# Patient Record
Sex: FEMALE | Race: WHITE
[De-identification: names, ages, dates, MRNs, and addresses within clinical notes are randomized per-mention and may not be internally consistent; named-entity substitution may affect disease eponyms.]

---

## 2022-07-21 ENCOUNTER — HOSPITAL ENCOUNTER (OUTPATIENT)
Dept: HOSPITAL 47 - RADMAMWWP | Age: 67
Discharge: HOME | End: 2022-07-21
Attending: FAMILY MEDICINE
Payer: MEDICARE

## 2022-07-21 DIAGNOSIS — Z78.0: ICD-10-CM

## 2022-07-21 DIAGNOSIS — N63.23: Primary | ICD-10-CM

## 2022-07-21 PROCEDURE — 76641 ULTRASOUND BREAST COMPLETE: CPT

## 2022-07-21 PROCEDURE — 77062 BREAST TOMOSYNTHESIS BI: CPT

## 2022-07-21 PROCEDURE — 77066 DX MAMMO INCL CAD BI: CPT

## 2022-07-21 NOTE — MM
Reason for Exam: Clinical finding. 

Last mammogram was performed 20 year(s) and 0 month(s) ago. 

Indicated Problems: 

Lump or thickening of the left side (size 20) for 1 Month(s).





Patient History: 

Menarche at age 12. First Full-Term Pregnancy at age 24. Postmenopausal. Patient has history of

breast feeding. 





Risk Values: 

Lydia 5 year model risk: 1.9%.

NCI Lifetime model risk: 6.4%.





Tissue Density: 

There are scattered fibroglandular densities.





Findings: 

Analyzed By CAD. 

High density 2.6 cm partially visualized mass far posterior central and inferior left breast, 6:00

position. Overlying palpable marker. Some scattered benign-appearing round calcifications are noted.

No other discrete abnormality is seen. 





Overall Assessment: Incomplete: need additional imaging evaluation, BI-RAD 0





Management: 

Diagnostic Breast Ultrasound of the left breast.

Whole left breast ultrasound.



Electronically signed and approved by: Armando Epperson M.D. Radiologist

## 2022-07-21 NOTE — USB
Reason for Exam: Clinical finding. 





Patient History: 

Menarche at age 12. First Full-Term Pregnancy at age 24. Postmenopausal. Patient has history of

breast feeding. 





Risk Values: 

Lydia 5 year model risk: 1.9%.

NCI Lifetime model risk: 6.4%.

Technique: 

Method: Whole Breast Handheld.  Patient Position: Supine.  





Findings: 

The whole breast of the left breast, the axilla of the left breast and the retroareolar of the left

breast were scanned.

Whole left breast ultrasound was performed including scanning of the subareolar region and axilla.



At the 5:00 palpable site, 7 cm from the nipple, there is the patient's suspicious irregular

hypoechoic mass measuring 3.0 x 2.6 x 1.4 cm. This is located very close to the underlying

pectoralis major muscle. Biopsy is recommended.



Located 1.4 cm away, also at 5:00, closer towards the nipple, a couple adjacent lobulated hypoechoic

areas are present entering 8 x 4 mm and 7 mm. These 2 areas are adjacent to each other with

aggregate dimension of 1.7 cm. Biopsy is recommended.



No other solid or cystic lesion or axillary lymphadenopathy. 





Overall Assessment: Highly suggestive of malignancy, BI-RAD 5





Management: 

Ultrasound Core Biopsy of the left breast.

1. Two site ultrasound guided core needle biopsy left breast, (1) 5:00, BI-RADS 5 left breast mass

(2) as well as the 2 adjacent possible satellite nodules which have an aggregate dimension of 1.7

cm.

2. The dominant mass is very close to the chest wall musculature. Correlate with physical exam

findings. MRI can be considered if concern for chest wall involvement.



Electronically signed and approved by: Armando Epperson M.D. Radiologist

## 2022-07-27 ENCOUNTER — HOSPITAL ENCOUNTER (OUTPATIENT)
Dept: HOSPITAL 47 - RADUSWWP | Age: 67
End: 2022-07-27
Attending: FAMILY MEDICINE
Payer: MEDICARE

## 2022-07-27 DIAGNOSIS — D05.12: Primary | ICD-10-CM

## 2022-07-27 DIAGNOSIS — Z17.0: ICD-10-CM

## 2022-07-27 PROCEDURE — 19084 BX BREAST ADD LESION US IMAG: CPT

## 2022-07-27 PROCEDURE — 88341 IMHCHEM/IMCYTCHM EA ADD ANTB: CPT

## 2022-07-27 PROCEDURE — 19083 BX BREAST 1ST LESION US IMAG: CPT

## 2022-07-27 PROCEDURE — 88305 TISSUE EXAM BY PATHOLOGIST: CPT

## 2022-07-27 PROCEDURE — 88342 IMHCHEM/IMCYTCHM 1ST ANTB: CPT

## 2022-08-26 ENCOUNTER — HOSPITAL ENCOUNTER (OUTPATIENT)
Dept: HOSPITAL 47 - OR | Age: 67
Discharge: HOME | End: 2022-08-26
Attending: SURGERY
Payer: MEDICARE

## 2022-08-26 VITALS — SYSTOLIC BLOOD PRESSURE: 142 MMHG | DIASTOLIC BLOOD PRESSURE: 76 MMHG | HEART RATE: 58 BPM

## 2022-08-26 VITALS — TEMPERATURE: 97 F

## 2022-08-26 VITALS — RESPIRATION RATE: 16 BRPM

## 2022-08-26 DIAGNOSIS — Z80.3: ICD-10-CM

## 2022-08-26 DIAGNOSIS — Z80.51: ICD-10-CM

## 2022-08-26 DIAGNOSIS — C50.512: Primary | ICD-10-CM

## 2022-08-26 DIAGNOSIS — E11.9: ICD-10-CM

## 2022-08-26 DIAGNOSIS — Z79.84: ICD-10-CM

## 2022-08-26 DIAGNOSIS — Z88.0: ICD-10-CM

## 2022-08-26 DIAGNOSIS — Z80.8: ICD-10-CM

## 2022-08-26 LAB — GLUCOSE BLD-MCNC: 145 MG/DL (ref 70–110)

## 2022-08-26 PROCEDURE — 77001 FLUOROGUIDE FOR VEIN DEVICE: CPT

## 2022-08-26 PROCEDURE — 36561 INSERT TUNNELED CV CATH: CPT

## 2022-08-26 NOTE — FL
Fluoroscopy

 

INDICATION: Port-A-Cath placement

 

FINDINGS:

Images obtained: One.

 

IMPRESSIONS:

1. Documentation of fluoroscopy.

## 2022-08-26 NOTE — XR
EXAMINATION TYPE: XR chest 1V confirm line [facility]

 

DATE OF EXAM: 8/26/2022 1:30 PM

 

COMPARISON: Fluoroscopic images 8/26/2022.

 

TECHNIQUE: XR chest 1V confirm line [facility] 

 

CLINICAL INDICATION:Female, 67 years old with history of check line; 

 

FINDINGS: 

Lungs/Pleura: There is no evidence of pleural effusion, focal consolidation, or pneumothorax.  

Pulmonary vascularity: Unremarkable.

Heart/mediastinum: Cardiomediastinal silhouette is unremarkable.

Musculoskeletal: No acute osseous pathology.

 

Lines/Tubes:

Wbwsii-u-[id number] projecting over the right hemithorax with distal tip projecting over the low superior v
dimple cava.

 

IMPRESSION: 

Interval placement of [id number] with tip terminating in the low SVC. No pneumothorax.

## 2022-08-26 NOTE — P.OP
Date of Procedure: 08/26/22


Procedure(s) Performed: 


PREOPERATIVE DIAGNOSIS: Breast cancer


POSTOPERATIVE DIAGNOSIS: Same


PROCEDURE: Port-A-Cath placement with fluoroscopic and ultrasound guidance


SURGEON: Cesar


EBL: Minimal


ANESTHESIA: Gen.


COMPLICATIONS: None


OPERATIVE PROCEDURE: Patient was brought and placed on the operative table in 

the supine position.  The patient was sedated per anesthesia that time.  The 

chest and neck were prepped and draped in usual sterile fashion.  The ultrasound

probe was used to identify the location of the right internal jugular vein.  The

skin was localized with lidocaine.  The Seldinger needle was advanced into the 

IJ under ultrasound guidance.  The wire was advanced through the needle under fl

uoroscopic guidance into the superior vena cava.  A port pocket was created in 

the right infraclavicular location.  The catheter was tunneled from the wire 

entrance site to the port pocket.  The port was then connected to the catheter. 

The dilator introducer was threaded over the guidewire.  The guidewire and 

dilator were then removed.  The catheter was advanced through the introducer and

introducer was then removed.  The tip was seen to be in the right atrial 

junction via fluoroscopy.  A picture of the radiograph showing the tip at the 

radial digital junction was taken.  Port was flushed with both saline and a Hep-

Lock solution.  There was good flow both in and out of the port.  The port was 

sutured in underlying tissues using 3-0 silk sutures.  The subcutaneous tissues 

were reapproximated using 3-0 Vicryl sutures and the skin at both locations 

using 4-0 Monocryl sutures.  Skin glue and sterile dressings then applied.


DISPOSITION: Stable to recovery room

## 2022-08-26 NOTE — P.GSHP
History of Present Illness


H&P Date: 08/26/22


Chief Complaint: Left breast cancer





67-year-old female here today for Port-A-Cath placement.  Patient has a recent 

diagnosis of left breast cancer.  Starting chemotherapy in the next 1-2 weeks.  

She has not had definitive surgery.





Past Medical History


Past Medical History: Cancer, Diabetes Mellitus


Additional Past Medical History / Comment(s): type 2 diabetes just started on 

metformin.  breast cancer left  dx 6/22


History of Any Multi-Drug Resistant Organisms: None Reported


Past Surgical History: Tubal Ligation


Additional Past Surgical History / Comment(s): breast biopsy left


Past Anesthesia/Blood Transfusion Reactions: No Reported Reaction


Additional Past Anesthesia/Blood Transfusion Reaction / Comment(s): noblood 

transfusions


Smoking Status: Never smoker





- Past Family History


  ** Mother


Family Medical History: No Reported History





  ** Father


Family Medical History: No Reported History





Medications and Allergies


                                Home Medications











 Medication  Instructions  Recorded  Confirmed  Type


 


metFORMIN HCL [Glucophage] 500 mg PO BID 07/22/22 08/26/22 History








                                    Allergies











Allergy/AdvReac Type Severity Reaction Status Date / Time


 


Penicillins Allergy Intermediate Rash/Hives Verified 08/26/22 09:45














Surgical - Exam


                                   Vital Signs











Temp Pulse Resp BP Pulse Ox


 


 97.1 F L  62   16   149/74   98 


 


 08/26/22 10:00  08/26/22 10:00  08/26/22 10:00  08/26/22 10:00  08/26/22 10:00

















Physical exam:


General: Well-developed, well-nourished


HEENT: Normocephalic, sclerae nonicteric


Abdomen: Nontender, nondistended


Extremities: No edema


Neuro: Alert and oriented





Results





- Labs


                  Abnormal Lab Results - Last 24 Hours (Table)











  08/26/22 Range/Units





  10:01 


 


POC Glucose (mg/dL)  145 H  ()  mg/dL














Assessment and Plan


(1) Breast cancer, left


Narrative/Plan: 


Will proceed with Port-A-Cath placement at this time. Risks of bleeding, 

infection, DVT, pneumothorax, catheter malfunction, anesthesia related 

complications were discussed.  The patient understands and wishes to proceed.


Current Visit: Yes   Status: Acute   Code(s): C50.912 - MALIGNANT NEOPLASM OF 

UNSPECIFIED SITE OF LEFT FEMALE BREAST   SNOMED Code(s): 911373443

## 2023-01-23 ENCOUNTER — HOSPITAL ENCOUNTER (OUTPATIENT)
Dept: HOSPITAL 47 - LABWHC1 | Age: 68
Discharge: HOME | End: 2023-01-23
Attending: NURSE PRACTITIONER
Payer: MEDICARE

## 2023-01-23 DIAGNOSIS — E27.40: Primary | ICD-10-CM

## 2023-01-23 DIAGNOSIS — E07.9: ICD-10-CM

## 2023-01-23 DIAGNOSIS — E78.70: ICD-10-CM

## 2023-01-23 DIAGNOSIS — E11.9: ICD-10-CM

## 2023-01-23 DIAGNOSIS — D50.9: ICD-10-CM

## 2023-01-23 DIAGNOSIS — E83.00: ICD-10-CM

## 2023-01-23 DIAGNOSIS — D68.9: ICD-10-CM

## 2023-01-23 LAB
ALBUMIN SERPL-MCNC: 4.3 G/DL (ref 3.8–4.9)
ALBUMIN/GLOB SERPL: 2.08 G/DL (ref 1.6–3.17)
ALP SERPL-CCNC: 53 U/L (ref 41–126)
ALT SERPL-CCNC: 19 U/L (ref 8–44)
AMYLASE SERPL-CCNC: 34 U/L (ref 23–121)
ANION GAP SERPL CALC-SCNC: 16.1 MMOL/L (ref 10–18)
AST SERPL-CCNC: 27 U/L (ref 13–35)
BASOPHILS # BLD AUTO: 0.03 X 10*3/UL (ref 0–0.1)
BASOPHILS NFR BLD AUTO: 0.8 %
BUN SERPL-SCNC: 10.4 MG/DL (ref 9–27)
BUN/CREAT SERPL: 17.93 RATIO (ref 12–20)
CALCIUM SPEC-MCNC: 9.3 MG/DL (ref 8.7–10.3)
CANCER AG15-3 SERPL-ACNC: 24.4 U/ML (ref 0–32.3)
CHLORIDE SERPL-SCNC: 103 MMOL/L (ref 96–109)
CO2 SERPL-SCNC: 21 MMOL/L (ref 20–27.5)
CRP SERPL HS-MCNC: 1.18 MG/L (ref 0–3)
EOSINOPHIL # BLD AUTO: 0.05 X 10*3/UL (ref 0.04–0.35)
EOSINOPHIL NFR BLD AUTO: 1.3 %
ERYTHROCYTE [DISTWIDTH] IN BLOOD BY AUTOMATED COUNT: 4.12 X 10*6/UL (ref 4.1–5.2)
ERYTHROCYTE [DISTWIDTH] IN BLOOD: 15.4 % (ref 11.5–14.5)
FERRITIN SERPL-MCNC: 289 NG/ML (ref 10–291)
GLIADIN IGA SER-ACNC: <0.2 U/ML
GLIADIN IGG SER IA-ACNC: <0.4 U/ML
GLIADIN PEPTIDE IGA SER-ACNC: NEGATIVE
GLIADIN PEPTIDE IGG SER-ACNC: NEGATIVE
GLOBULIN SER CALC-MCNC: 2.1 G/DL (ref 1.6–3.3)
GLUCOSE SERPL-MCNC: 115 MG/DL (ref 70–110)
HCT VFR BLD AUTO: 39.2 % (ref 37.2–46.3)
HGB BLD-MCNC: 12.6 G/DL (ref 12–15)
IMM GRANULOCYTES BLD QL AUTO: 0.3 %
IRON SERPL-MCNC: 53 UG/DL (ref 50–170)
LIPASE SERPL-CCNC: 29 U/L (ref 14–63)
LYMPHOCYTES # SPEC AUTO: 0.8 X 10*3/UL (ref 0.9–5)
LYMPHOCYTES NFR SPEC AUTO: 20.7 %
MAGNESIUM SPEC-SCNC: 2 MG/DL (ref 1.5–2.4)
MCH RBC QN AUTO: 30.6 PG (ref 27–32)
MCHC RBC AUTO-ENTMCNC: 32.1 G/DL (ref 32–37)
MCV RBC AUTO: 95.1 FL (ref 80–97)
MONOCYTES # BLD AUTO: 0.22 X 10*3/UL (ref 0.2–1)
MONOCYTES NFR BLD AUTO: 5.7 %
NEUTROPHILS # BLD AUTO: 2.76 X 10*3/UL (ref 1.8–7.7)
NEUTROPHILS NFR BLD AUTO: 71.2 %
NRBC BLD AUTO-RTO: 0 /100 WBCS (ref 0–0)
PLATELET # BLD AUTO: 169 X 10*3/UL (ref 140–440)
POTASSIUM SERPL-SCNC: 4 MMOL/L (ref 3.5–5.5)
PROT SERPL-MCNC: 6.4 G/DL (ref 6.2–8.2)
SODIUM SERPL-SCNC: 140 MMOL/L (ref 135–145)
T4 FREE SERPL-MCNC: 1.09 NG/DL (ref 0.8–1.8)
TIBC SERPL-MCNC: 407 UG/DL (ref 228–460)
WBC # BLD AUTO: 3.87 X 10*3/UL (ref 4.5–10)

## 2023-01-23 PROCEDURE — 83090 ASSAY OF HOMOCYSTEINE: CPT

## 2023-01-23 PROCEDURE — 82150 ASSAY OF AMYLASE: CPT

## 2023-01-23 PROCEDURE — 83735 ASSAY OF MAGNESIUM: CPT

## 2023-01-23 PROCEDURE — 82397 CHEMILUMINESCENT ASSAY: CPT

## 2023-01-23 PROCEDURE — 83695 ASSAY OF LIPOPROTEIN(A): CPT

## 2023-01-23 PROCEDURE — 83516 IMMUNOASSAY NONANTIBODY: CPT

## 2023-01-23 PROCEDURE — 83540 ASSAY OF IRON: CPT

## 2023-01-23 PROCEDURE — 82728 ASSAY OF FERRITIN: CPT

## 2023-01-23 PROCEDURE — 82607 VITAMIN B-12: CPT

## 2023-01-23 PROCEDURE — 85025 COMPLETE CBC W/AUTO DIFF WBC: CPT

## 2023-01-23 PROCEDURE — 84481 FREE ASSAY (FT-3): CPT

## 2023-01-23 PROCEDURE — 82533 TOTAL CORTISOL: CPT

## 2023-01-23 PROCEDURE — 83036 HEMOGLOBIN GLYCOSYLATED A1C: CPT

## 2023-01-23 PROCEDURE — 83880 ASSAY OF NATRIURETIC PEPTIDE: CPT

## 2023-01-23 PROCEDURE — 82525 ASSAY OF COPPER: CPT

## 2023-01-23 PROCEDURE — 84443 ASSAY THYROID STIM HORMONE: CPT

## 2023-01-23 PROCEDURE — 83550 IRON BINDING TEST: CPT

## 2023-01-23 PROCEDURE — 86141 C-REACTIVE PROTEIN HS: CPT

## 2023-01-23 PROCEDURE — 84100 ASSAY OF PHOSPHORUS: CPT

## 2023-01-23 PROCEDURE — 36415 COLL VENOUS BLD VENIPUNCTURE: CPT

## 2023-01-23 PROCEDURE — 84630 ASSAY OF ZINC: CPT

## 2023-01-23 PROCEDURE — 83525 ASSAY OF INSULIN: CPT

## 2023-01-23 PROCEDURE — 84439 ASSAY OF FREE THYROXINE: CPT

## 2023-01-23 PROCEDURE — 86376 MICROSOMAL ANTIBODY EACH: CPT

## 2023-01-23 PROCEDURE — 84432 ASSAY OF THYROGLOBULIN: CPT

## 2023-01-23 PROCEDURE — 86300 IMMUNOASSAY TUMOR CA 15-3: CPT

## 2023-01-23 PROCEDURE — 83690 ASSAY OF LIPASE: CPT

## 2023-01-23 PROCEDURE — 82746 ASSAY OF FOLIC ACID SERUM: CPT

## 2023-01-23 PROCEDURE — 80053 COMPREHEN METABOLIC PANEL: CPT

## 2023-01-23 PROCEDURE — 84482 T3 REVERSE: CPT

## 2023-01-23 PROCEDURE — 83970 ASSAY OF PARATHORMONE: CPT

## 2023-05-01 ENCOUNTER — HOSPITAL ENCOUNTER (OUTPATIENT)
Dept: HOSPITAL 47 - RADBDWWP | Age: 68
Discharge: HOME | End: 2023-05-01
Attending: INTERNAL MEDICINE
Payer: MEDICARE

## 2023-05-01 DIAGNOSIS — Z01.818: Primary | ICD-10-CM

## 2023-05-01 DIAGNOSIS — R11.0: ICD-10-CM

## 2023-05-01 DIAGNOSIS — R19.7: ICD-10-CM

## 2023-05-01 DIAGNOSIS — I08.3: ICD-10-CM

## 2023-05-01 DIAGNOSIS — M85.851: ICD-10-CM

## 2023-05-01 DIAGNOSIS — Z78.0: ICD-10-CM

## 2023-05-01 DIAGNOSIS — E87.70: ICD-10-CM

## 2023-05-01 DIAGNOSIS — C50.512: ICD-10-CM

## 2023-05-01 PROCEDURE — 77080 DXA BONE DENSITY AXIAL: CPT

## 2023-05-01 PROCEDURE — 93306 TTE W/DOPPLER COMPLETE: CPT

## 2023-05-01 NOTE — BD
EXAMINATION TYPE: Axial Bone Density

 

DATE OF EXAM: 5/1/2023

 

CLINICAL HISTORY: 67 years old Female.  ICD-10 CODE: C50.512 BREAST CA

 

Height:  67.25

Weight:  171.9

 

FRAX RISK QUESTIONS:

Alcohol (3 or more units per day):  no

Family History (Parent hip fracture):  no

Glucocorticoids (More than 3mos):  no      

History of Fracture in Adulthood: no

 

Secondary Osteoporosis:

  1.  Type 1 Diabetes: no

  2.  Hyperthyroidism: no

  3.  Menopause before 45: no

  4.  Malnutrition: no

  5.  Chronic liver disease: no

Rheumatoid Arthritis: no

Current Tobacco Use: no

 

RISK FACTORS 

HISTORY OF: 

Hip Fracture (Right/Left): no

Spine Fracture: no

History of Wrist Fracture: no

Surgery to Spine/Hip(right/left)/Wrist (right/left): no

Family History of Osteoporosis: mother

Active: yes

Diet low in dairy products/other sources of calcium:  no

Postmenopausal woman: yes

Take estrogen and/or progesterone medications: no

Lost more than 2 inches in height since high school: no

Frequent falls: no

Poor Health: no

Hyperparathyroidism: no

Adrenal Insufficiency: no

 

MEDICATIONS: 

Prednisone or other steroids: no

Thyroid Medications:  no

Osteoporosis Medications: no

Additional Medications: Vit D, Metformin, Calcium, 

 

 

Additional History: Breast Ca. August 2022 with Lumpectomy, Chemo and Radiation

 

 

EXAM MEASUREMENTS: 

Bone mineral densitometry was performed using the [employer] System.

Bone mineral density as measured about the Lumbar spine is:  

----- L1-L4(G/cm2): 1.281

T Score Values are as follows:

----- L1: -0.7

----- L2: -0.4

----- L3: 0.7

----- L4: 2.9

----- L1-L4: 0.8

 

Z Score Values are as follows:

----- L1: 0.5

----- L2: 0.8

----- L3: 1.9

----- L4: 4.1

----- L1-L4: 2.1

Baseline Study

 

Bone mineral density about the R hip (g/cm2): 0.921

Bone mineral density about the L hip (g/cm2): 0.942

T Score values are as follows:

-----R Neck: -1.1

-----L Neck: -0.8

-----R Total: -0.7

-----L Total: -0.5

 

Z Score values are as follows:

-----R Neck: 0.3

-----L Neck: 0.5

-----R Total: 0.4

-----L Total: 0.6

Baseline Study 

 

 

FRAX%s: The graph provided illustrates a 8.8% chance for a major osteoporotic fx and a 0.9% chance fo
r the hips probability for fx in 10 years time.

 

 

 

 

IMPRESSION:

Osteopenia (T Score between -2.5 and -1) at the femoral neck level in the right hip.

 

There is slightly increased risk of fracture and the patient may be considered 

for treatment. 

 

Re-Screen 2-5 years.

 

NOTE:  T-SCORE=SD OF THE YOUNG ADULT MEAN.

## 2023-05-01 NOTE — CA
Transthoracic Echo Report 

 Name: Colleen Sanchez 

 MRN:    [id number] 

 Age:    67     Gender:     F 

 

 :    1955 

 Exam Date:     2023 13:26 

 Exam Location: [facility] 

 Ht (in):     67     Wt (lb):     171 

 Ordering Physician:        Sudeep Almaraz MD 

 Attending/Referring Phys: 

 Technician         Yolanda Mccall RDCS 

 Procedure CPT: 

 Indications:       C50.512 breast ca 

 

 Cardiac Hx: 

 Technical Quality:      Good 

 Contrast 1:                                Total Dose (mL): 

 Contrast 2:                                Total Dose (mL): 

 

 MEASUREMENTS  (Male / Female) Normal Values 

 2D ECHO 

 LV Diastolic Diameter PLAX        4.9 cm                4.2 - 5.9 / 3.9 - 5.3 cm 

 LV Systolic Diameter PLAX         2.7 cm                 

 IVS Diastolic Thickness           1.1 cm                0.6 - 1.0 / 0.6 - 0.9 cm 

 LVPW Diastolic Thickness          1.3 cm                0.6 - 1.0 / 0.6 - 0.9 cm 

 LV Relative Wall Thickness        0.5                    

 RV Internal Dim ED PLAX           3.3 cm                 

 LA Systolic Diameter LX           3.8 cm                3.0 - 4.0 / 2.7 - 3.8 cm 

 LA Volume                         70.7 cm???              18 - 58 / 22 - 52 cm??? 

 

 M-MODE 

 Aortic Root Diameter MM           3.3 cm                 

 MV E Point Septal Separation      0.5 cm                 

 AV Cusp Separation MM             2.1 cm                 

 

 DOPPLER 

 AV Peak Velocity                  165.8 cm/s             

 AV Peak Gradient                  11.0 mmHg              

 AI Peak Velocity                  273.9 cm/s             

 AI Peak Gradient                  30.0 mmHg              

 AI Pressure Half Time             1100.3 ms              

 MV Area PHT                       2.7 cm???                

 Mitral E Point Velocity           74.8 cm/s              

 Mitral A Point Velocity           94.0 cm/s              

 Mitral E to A Ratio               0.8                    

 MV Deceleration Time              280.4 ms               

 MV E' Velocity                    9.9 cm/s               

 Mitral E to MV E' Ratio           7.5                    

 TR Peak Velocity                  260.5 cm/s             

 TR Peak Gradient                  27.2 mmHg              

 Right Ventricular Systolic Press  32.2 mmHg              

 

 

 FINDINGS 

 Left Ventricle 

 Left ventricular ejection fraction is estimated at 55-60 %. Left ventricular  

 cavity size normal.  Mildly increased septal wall thickness. Mildly increased  

 posterior wall thickness.left ventricular cavity size normal. 

 

 Right Ventricle 

 Normal right ventricular size. Right ventricular systolic pressure within  

 normal limits. Right ventricular systolic pressure estimated at 32 mm hg. 

 

 Right Atrium 

 Normal right atrial size. 

 

 Left Atrium 

 Moderately increased left atrial volume. Mildly increased left atrial area. 

 

 Mitral Valve 

 Mitral annular calcification. Mild mitral regurgitation. 

 

 Aortic Valve 

 Trileaflet aortic valve. Aortic valve sclerosis. Mild aortic regurgitation. 

 

 Tricuspid Valve 

 Structurally normal tricuspid valve. Mild tricuspid regurgitation. 

 

 Pulmonic Valve 

 Pulmonic valve not well visualized. Trace pulmonic regurgitation. 

 

 Pericardium 

 Normal pericardium. No pericardial effusion. 

 

 Aorta 

 Normal size aortic root and proximal ascending aorta. 

 

 CONCLUSIONS 

 1.  Normal left ventricle size and systolic function 

 2.  Mild mitral, aortic and tricuspid regurgitation 

 3.  No pericardial effusion. 

 Previewed by:  

 Dr. Doyle Duran MD 

 (Electronically Signed) 

 Final Date:      01 May 2023 18:02

## 2023-07-10 ENCOUNTER — HOSPITAL ENCOUNTER (OUTPATIENT)
Dept: HOSPITAL 47 - RADMAMWWP | Age: 68
Discharge: HOME | End: 2023-07-10
Attending: RADIOLOGY
Payer: MEDICARE

## 2023-07-10 DIAGNOSIS — Z80.51: ICD-10-CM

## 2023-07-10 DIAGNOSIS — Z17.0: ICD-10-CM

## 2023-07-10 DIAGNOSIS — Z92.22: ICD-10-CM

## 2023-07-10 DIAGNOSIS — Z78.0: ICD-10-CM

## 2023-07-10 DIAGNOSIS — C50.512: Primary | ICD-10-CM

## 2023-07-10 PROCEDURE — 77062 BREAST TOMOSYNTHESIS BI: CPT

## 2023-07-10 PROCEDURE — 77066 DX MAMMO INCL CAD BI: CPT

## 2023-07-10 NOTE — MM
Reason for Exam: Follow-up at short interval from prior study. 

Last screening mammogram was performed 12 month(s) ago.





Patient History: 

Menarche at age 12. First Full-Term Pregnancy at age 24. Postmenopausal. Patient has history of

breast feeding. Breast cancer, left, age 66. 07/27/2022, Malignant US biopsy breast VAD LT on the

left side. 07/27/2022, US biopsy breast add'l VAD LT on the Left side. 





Tissue Density: 

The breast tissue is heterogeneously dense. This may lower the sensitivity of mammography.





Findings: 

Analyzed By CAD. 

Postlumpectomy radiation therapy changes are noted left breast. There is no evidence for recurrent

or residual mass. Scattered benign-appearing calcifications are seen bilaterally. 





Overall Assessment: Benign, BI-RAD 2





Management: 

Diagnostic Mammogram of both breasts in 1 year.

.  Results were given to the patient verbally at the time of exam.



Patient should continue monthly self-breast exams.  A clinical breast exam by your physician is

recommended on an annual basis.

This exam should not preclude additional follow-up of suspicious palpable abnormalities.





Note on Lydia scores and lifetime risk:

1. A Lydia score greater than 3% is considered moderate risk. If this is the case, consider

specialist referral to assess eligibility for a risk reducing agent.

2. If overall lifetime risk for the development of breast cancer is 20% or higher, the patient may

qualify for future screening with alternating mammogram and breast MRI.



Electronically signed and approved by: Leopold M. Fregoli, M.D. Radiologis

## 2023-08-07 ENCOUNTER — HOSPITAL ENCOUNTER (OUTPATIENT)
Dept: HOSPITAL 47 - RADECHMAIN | Age: 68
Discharge: HOME | End: 2023-08-07
Attending: INTERNAL MEDICINE
Payer: MEDICARE

## 2023-08-07 DIAGNOSIS — Z01.818: Primary | ICD-10-CM

## 2023-08-07 DIAGNOSIS — M85.9: ICD-10-CM

## 2023-08-07 DIAGNOSIS — Z71.3: ICD-10-CM

## 2023-08-07 DIAGNOSIS — C50.512: ICD-10-CM

## 2023-08-07 DIAGNOSIS — I08.1: ICD-10-CM

## 2023-08-07 PROCEDURE — 93306 TTE W/DOPPLER COMPLETE: CPT

## 2023-08-08 NOTE — CA
Transthoracic Echo Report 

 Name: Colleen Sanchez 

 MRN:    [id number] 

 Age:    67     Gender:     F 

 

 :    1955 

 Exam Date:     2023 13:32 

 Exam Location: [facility] 

 Ht (in):     69     Wt (lb):     172 

 Ordering Physician:        Sudeep Almaraz MD 

 Attending/Referring Phys: 

 Technician         Ciara Simeon [facility] 

 Procedure CPT: 

 Indications:       Z01.818 

 

 Cardiac Hx: 

 Technical Quality:      Fair 

 Contrast 1:                                Total Dose (mL): 

 Contrast 2:                                Total Dose (mL): 

 

 MEASUREMENTS  (Male / Female) Normal Values 

 2D ECHO 

 LV Diastolic Diameter PLAX        5.0 cm                4.2 - 5.9 / 3.9 - 5.3 cm 

 LV Systolic Diameter PLAX         3.4 cm                 

 IVS Diastolic Thickness           0.8 cm                0.6 - 1.0 / 0.6 - 0.9 cm 

 LVPW Diastolic Thickness          0.9 cm                0.6 - 1.0 / 0.6 - 0.9 cm 

 LV Relative Wall Thickness        0.3                    

 Ascending Aorta Diameter          4.0 cm                 

 

 M-MODE 

 Aortic Root Diameter MM           2.8 cm                 

 LA Systolic Diameter MM           3.5 cm                 

 LA Ao Ratio MM                    1.2                    

 AV Cusp Separation MM             1.9 cm                 

 

 DOPPLER 

 AV Peak Velocity                  163.0 cm/s             

 AV Peak Gradient                  10.6 mmHg              

 AV Mean Velocity                  112.5 cm/s             

 AV Mean Gradient                  5.7 mmHg               

 AV Velocity Time Integral         30.0 cm                

 LVOT Peak Velocity                121.1 cm/s             

 LVOT Peak Gradient                5.9 mmHg               

 LVOT Velocity Time Integral       23.1 cm                

 Mitral E Point Velocity           69.7 cm/s              

 Mitral A Point Velocity           95.3 cm/s              

 Mitral E to A Ratio               0.7                    

 MV Deceleration Time              337.0 ms               

 LV E' Lateral Velocity            11.6 cm/s              

 Mitral E to LV E' Lateral Ratio   6.0                    

 LV E' Septal Velocity             8.6 cm/s               

 Mitral E to LV E' Septal Ratio    8.1                    

 TR Peak Velocity                  246.9 cm/s             

 TR Peak Gradient                  24.4 mmHg              

 Right Atrial Pressure             3.0 mmHg               

 Pulmonary Artery Systolic Pressu  27.4 mmHg              

 Right Ventricular Systolic Press  27.4 mmHg              

 

 

 FINDINGS 

 Left Ventricle 

 Normal Left ventricular size, wall thickness, systolic function with no obvious  

 regional wall motion abnormalities. Left ventricular ejection fraction is  

 estimated at 55-60%. 

 

 Right Ventricle 

 Normal right ventricular size and function. 

 

 Right Atrium 

 Normal right atrial size. 

 

 Left Atrium 

 Normal left atrial size. 

 

 Mitral Valve 

 Structurally normal mitral valve. Mitral valve thickened. Mild mitral annular  

 calcification. Mild mitral regurgitation. 

 

 Aortic Valve 

 Trileaflet aortic valve. Aortic valve sclerosis. Trace aortic regurgitation. 

 

 Tricuspid Valve 

 Structurally normal tricuspid valve. Mild tricuspid regurgitation. 

 

 Pulmonic Valve 

 Pulmonic valve not well visualized. 

 

 Pericardium 

 No pericardial effusion. 

 

 Aorta 

 Normal size aortic root and mildly dilated proximal ascending aorta. 

 

 CONCLUSIONS 

 Normal LV size and systolic function.  Mild mitral and tricuspid regurgitation.   

 No pericardial effusion.  No pulmonary hypertension.  Ascending aorta is  

 slightly prominent 

 Previewed by:  

 Dr. Amari Lay MD 

 (Electronically Signed) 

 Final Date:      2023 10:16

## 2023-09-18 ENCOUNTER — HOSPITAL ENCOUNTER (OUTPATIENT)
Dept: HOSPITAL 47 - OR | Age: 68
Discharge: HOME | End: 2023-09-18
Attending: SURGERY
Payer: MEDICARE

## 2023-09-18 VITALS — SYSTOLIC BLOOD PRESSURE: 166 MMHG | DIASTOLIC BLOOD PRESSURE: 83 MMHG

## 2023-09-18 VITALS — HEART RATE: 57 BPM | RESPIRATION RATE: 18 BRPM

## 2023-09-18 VITALS — TEMPERATURE: 97.6 F

## 2023-09-18 DIAGNOSIS — Z90.89: ICD-10-CM

## 2023-09-18 DIAGNOSIS — Z79.811: ICD-10-CM

## 2023-09-18 DIAGNOSIS — E11.9: ICD-10-CM

## 2023-09-18 DIAGNOSIS — Z79.899: ICD-10-CM

## 2023-09-18 DIAGNOSIS — Z98.51: ICD-10-CM

## 2023-09-18 DIAGNOSIS — Z88.0: ICD-10-CM

## 2023-09-18 DIAGNOSIS — C50.912: Primary | ICD-10-CM

## 2023-09-18 LAB — GLUCOSE BLD-MCNC: 127 MG/DL (ref 70–110)

## 2023-09-18 PROCEDURE — 36590 REMOVAL TUNNELED CV CATH: CPT

## 2023-09-18 NOTE — P.OP
Date of Procedure: 09/18/23


Procedure(s) Performed: 


PREOPERATIVE DIAGNOSIS: Breast cancer


POSTOPERATIVE DIAGNOSIS: Same


PROCEDURE: Port-A-Cath removal


SURGEON: Cesar


EBL: Minimal


ANESTHESIA: Sedation


COMPLICATIONS: None


OPERATIVE PROCEDURE: Patient was placed in the supine position.  The patient was

sedated per anesthesia that time.  The chest was prepped and draped in the usual

sterile fashion.  The skin was localized with Marcaine solution.  The previous 

incision was re-incised using a scalpel.  The port was easily excised using 

accommodation of blunt dissection sharp dissection and electrocautery.  The 

subcutaneous tissues were reapproximated using 3-0 Vicryl sutures.  The skin was

reapproximated using 4-0 Monocryl sutures.  Skin glue was then applied.


DISPOSITION: Stable to recovery room

## 2023-09-18 NOTE — P.GSHP
History of Present Illness


H&P Date: 09/18/23


Chief Complaint: breast cancer





68-year-old female here for Port-A-Cath removal.  Patient had no issues with the

port while it was in.  She has finished chemotherapy.





Past Medical History


Past Medical History: Cancer, Diabetes Mellitus


Additional Past Medical History / Comment(s): .  breast cancer left  dx 6/22


History of Any Multi-Drug Resistant Organisms: None Reported


Past Surgical History: Tonsillectomy, Tubal Ligation


Additional Past Surgical History / Comment(s): breast biopsy left ,lumpectomy


Past Anesthesia/Blood Transfusion Reactions: No Reported Reaction


Additional Past Anesthesia/Blood Transfusion Reaction / Comment(s): no blood 

transfusions


Smoking Status: Never smoker





- Past Family History


  ** Mother


Family Medical History: No Reported History





  ** Father


Family Medical History: No Reported History





Medications and Allergies


                                Home Medications











 Medication  Instructions  Recorded  Confirmed  Type


 


Semaglutide [Rybelsus] 3 mg PO DAILY 05/04/23 09/14/23 History


 


Thiamine [Vitamin B-1] 50 mg PO DAILY 05/04/23 09/14/23 History


 


Letrozole 2.5 mg PO DAILY 05/25/23 09/14/23 History








                                    Allergies











Allergy/AdvReac Type Severity Reaction Status Date / Time


 


Penicillins Allergy Intermediate Rash/Hives Verified 09/14/23 11:24














Surgical - Exam


                                   Vital Signs











Temp Pulse Resp BP Pulse Ox


 


 97.6 F   57 L  18   169/79   97 


 


 09/18/23 06:39  09/18/23 06:39  09/18/23 06:39  09/18/23 06:39  09/18/23 06:39

















Physical exam:


General: Well-developed, well-nourished


HEENT: Normocephalic, sclerae nonicteric


Abdomen: Nontender, nondistended


Extremities: No edema


Neuro: Alert and oriented


Right-sided Port-A-Cath in place





Results





- Labs


                  Abnormal Lab Results - Last 24 Hours (Table)











  09/18/23 Range/Units





  06:48 


 


POC Glucose (mg/dL)  127 H  ()  mg/dL














Assessment and Plan


(1) Breast cancer, left


Narrative/Plan: 


Will proceed with Port-A-Cath removal at this time.


Current Visit: No   Status: Acute   Code(s): C50.912 - MALIGNANT NEOPLASM OF 

UNSPECIFIED SITE OF LEFT FEMALE BREAST   SNOMED Code(s): 035452549

## 2025-05-12 ENCOUNTER — HOSPITAL ENCOUNTER (OUTPATIENT)
Dept: HOSPITAL 47 - RADBDWWP | Age: 70
Discharge: HOME | End: 2025-05-12
Attending: INTERNAL MEDICINE
Payer: MEDICARE

## 2025-05-12 DIAGNOSIS — Z71.3: ICD-10-CM

## 2025-05-12 DIAGNOSIS — C50.512: ICD-10-CM

## 2025-05-12 DIAGNOSIS — M81.0: Primary | ICD-10-CM

## 2025-05-12 DIAGNOSIS — M85.89: ICD-10-CM

## 2025-05-12 PROCEDURE — 77080 DXA BONE DENSITY AXIAL: CPT
